# Patient Record
Sex: FEMALE | Race: WHITE | NOT HISPANIC OR LATINO | Employment: FULL TIME | URBAN - METROPOLITAN AREA
[De-identification: names, ages, dates, MRNs, and addresses within clinical notes are randomized per-mention and may not be internally consistent; named-entity substitution may affect disease eponyms.]

---

## 2018-02-28 ENCOUNTER — HOSPITAL ENCOUNTER (EMERGENCY)
Facility: HOSPITAL | Age: 58
Discharge: HOME | End: 2018-02-28
Attending: EMERGENCY MEDICINE
Payer: COMMERCIAL

## 2018-02-28 VITALS
DIASTOLIC BLOOD PRESSURE: 89 MMHG | OXYGEN SATURATION: 98 % | HEIGHT: 65 IN | HEART RATE: 68 BPM | WEIGHT: 160 LBS | TEMPERATURE: 99 F | RESPIRATION RATE: 18 BRPM | BODY MASS INDEX: 26.66 KG/M2 | SYSTOLIC BLOOD PRESSURE: 141 MMHG

## 2018-02-28 DIAGNOSIS — S00.83XA FOREHEAD CONTUSION, INITIAL ENCOUNTER: Primary | ICD-10-CM

## 2018-02-28 DIAGNOSIS — S09.90XA TRAUMATIC INJURY OF HEAD, INITIAL ENCOUNTER: ICD-10-CM

## 2018-02-28 PROCEDURE — 99284 EMERGENCY DEPT VISIT MOD MDM: CPT

## 2018-02-28 NOTE — ED PROVIDER NOTES
SCRIBE #1 NOTE: I, Genaro Frank, am scribing for, and in the presence of, Leona Landa MD. I have scribed the entire note.      History      Chief Complaint   Patient presents with    Head Injury     yesterday. Denies LOC. Sent from clinic for CT head. Takes plavix.        Review of patient's allergies indicates:   Allergen Reactions    Sulfa (sulfonamide antibiotics)         HPI   HPI    2/28/2018, 12:58 PM   History obtained from the patient      History of Present Illness: Tri Gomes is a 57 y.o. female patient who presents to the Emergency Department for a head injury which onset 1 day ago. Symptoms are constant and moderate in severity. Pt states she was moving a hog trap with her son and when it fell to the ground it pulled her with it and she hit the left side of her head on the trap. Pt is on a blood thinner (plavix). No mitigating or exacerbating factors reported. No associated sx reported. Patient denies any LOC, n/v/d, numbness, weakness, dizziness, confusion, visual changes, HA, lightheadedness, and all other sxs at this time. No further complaints or concerns at this time.         Arrival mode: Personal vehicle     PCP: Ivon Bermeo MD       Past Medical History:  Past Medical History:   Diagnosis Date    Hyperlipidemia     Hypertension        Past Surgical History:  Past Surgical History:   Procedure Laterality Date    EYE SURGERY           Family History:  Family History   Problem Relation Age of Onset    Heart disease Mother     Hypertension Mother     Heart disease Father     Hypertension Father     Cancer Maternal Grandfather     Diabetes Neg Hx        Social History:  Social History     Social History Main Topics    Smoking status: Current Every Day Smoker     Packs/day: 0.50     Years: 30.00    Smokeless tobacco: Never Used    Alcohol use Yes      Comment: social    Drug use: No    Sexual activity: Yes       ROS   Review of Systems   Constitutional:  Negative for chills and fever.        - LOC   HENT: Negative for sore throat.         + head injury   Eyes: Negative for photophobia and visual disturbance.   Respiratory: Negative for shortness of breath.    Cardiovascular: Negative for chest pain.   Gastrointestinal: Negative for diarrhea, nausea and vomiting.   Genitourinary: Negative for dysuria.   Musculoskeletal: Negative for back pain.   Skin: Negative for rash.   Neurological: Negative for dizziness, weakness, light-headedness, numbness and headaches.   Hematological: Does not bruise/bleed easily.   Psychiatric/Behavioral: Negative for confusion.       Physical Exam      Initial Vitals [02/28/18 1145]   BP Pulse Resp Temp SpO2   (!) 166/106 87 18 99.3 °F (37.4 °C) 98 %      MAP       126          Physical Exam  Nursing Notes and Vital Signs Reviewed.  Constitutional: Patient is in no acute distress. Well-developed and well-nourished.  Head: Normocephalic. Soft tissue contusion to the lateral left orbital region extending above the left eye brow with mild tenderness.   Eyes: PERRL. EOM intact. Conjunctivae are not pale. No scleral icterus. Visual fields are intact. No conjunctival injection or edema. Normal sclera. No proptosis.   ENT: Mucous membranes are moist. Oropharynx is clear and symmetric.    Neck: Supple. Full ROM. No lymphadenopathy.  Cardiovascular: Regular rate. Regular rhythm. No murmurs, rubs, or gallops. Distal pulses are 2+ and symmetric.  Pulmonary/Chest: No respiratory distress. Clear to auscultation bilaterally. No wheezing or rales.  Abdominal: Soft and non-distended.  There is no tenderness.  No rebound, guarding, or rigidity.   Musculoskeletal: Moves all extremities. No obvious deformities. No edema.  Skin: Warm and dry.  Neurological:  Alert, awake, and appropriate.  Normal speech.  No acute focal neurological deficits are appreciated.  Psychiatric: Normal affect. Good eye contact. Appropriate in content.    ED Course    Procedures  ED  "Vital Signs:  Vitals:    02/28/18 1145   BP: (!) 166/106   Pulse: 87   Resp: 18   Temp: 99.3 °F (37.4 °C)   TempSrc: Oral   SpO2: 98%   Weight: 72.6 kg (160 lb)   Height: 5' 5" (1.651 m)           Imaging Results:  Imaging Results          CT Head Without Contrast (Final result)  Result time 02/28/18 13:30:45    Final result by Genaro Day MD (02/28/18 13:30:45)                 Impression:       No acute abnormality identified.    All CT scans at this facility use dose modulation, iterative reconstruction and/or weight based dosing when appropriate to reduce radiation dose to as low as reasonably achievable.      Electronically signed by: GENARO DAY MD  Date:     02/28/18  Time:    13:30              Narrative:    Indication: Headache posttrauma.    Axial CT images of the head were obtained without  contrast.    Comparison:  None    Findings:    Ventricles, sulci, and cisterns are symmetric without evidence of mass effect. Possible arachnoid cyst or focal atrophy which may be developmental within the right parietal region. Brain volume and white matter are normal for age.  No intra or extraaxial masses, hemorrhages, abnormal fluid collections or abnormal calcifications. The cranium and extracranial structures are unremarkable.  Visualized sinuses and mastoid air cells are clear.                                      The Emergency Provider reviewed the vital signs and test results, which are outlined above.    ED Discussion     2:00 PM: Reassessed pt at this time.  Pt is awake, alert, and in no distress. Discussed with pt all pertinent ED information and results. Discussed pt dx and plan of tx. Gave pt all f/u and return to the ED instructions. All questions and concerns were addressed at this time. Pt expresses understanding of information and instructions, and is comfortable with plan to discharge. Pt is stable for discharge.    Patient's headache is either consistent with previous headache and/or lacks " features concerning for emergent or life threatening condition.  I do not suspect SAH, meningitis, increased IC pressure, infectious, toxic, vascular, CNS, or other EMC.  I have discussed this at length with patient and/or family/caretaker.      ED Medication(s):  Medications - No data to display    New Prescriptions    No medications on file       Follow-up Information     Ivon Bermeo MD. Schedule an appointment as soon as possible for a visit in 2 days.    Specialty:  Internal Medicine  Why:  Return to the Emergency Room, If symptoms worsen  Contact information:  [id number] [place] [id number]  [phone]                     Medical Decision Making    Medical Decision Making:   Clinical Tests:   Radiological Study: Ordered and Reviewed           Scribe Attestation:   Scribe #1: I performed the above scribed service and the documentation accurately describes the services I performed. I attest to the accuracy of the note.    Attending:   Physician Attestation Statement for Scribe #1: I, Leona Landa MD, personally performed the services described in this documentation, as scribed by Genaro Frank, in my presence, and it is both accurate and complete.          Clinical Impression       ICD-10-CM ICD-9-CM   1. Forehead contusion, initial encounter S00.83XA 920   2. Traumatic injury of head, initial encounter S09.90XA 959.01       Disposition:   Disposition: Discharged  Condition: Stable       Leona Landa MD  03/03/18 1149

## 2019-09-16 PROBLEM — F41.9 ANXIETY: Status: ACTIVE | Noted: 2019-09-16

## 2019-09-16 PROBLEM — Z00.00 ROUTINE GENERAL MEDICAL EXAMINATION AT A HEALTH CARE FACILITY: Status: ACTIVE | Noted: 2019-09-16

## 2019-09-16 PROBLEM — F17.200 SMOKER: Status: ACTIVE | Noted: 2019-09-16

## 2019-09-16 PROBLEM — R73.9 HYPERGLYCEMIA: Status: ACTIVE | Noted: 2019-09-16

## 2019-09-16 PROBLEM — M13.0 POLYARTHROPATHY: Status: ACTIVE | Noted: 2019-09-16

## 2019-09-16 PROBLEM — L98.9 SKIN LESION OF FACE: Status: ACTIVE | Noted: 2019-09-16

## 2019-09-16 PROBLEM — H54.8 BLINDNESS, LEGAL: Status: ACTIVE | Noted: 2019-09-16

## 2019-09-16 PROBLEM — E55.9 VITAMIN D DEFICIENCY: Status: ACTIVE | Noted: 2019-09-16

## 2019-09-16 PROBLEM — R91.1 PULMONARY NODULE: Status: ACTIVE | Noted: 2019-09-16

## 2019-09-16 PROBLEM — R53.82 CHRONIC FATIGUE: Status: ACTIVE | Noted: 2019-09-16

## 2019-09-16 PROBLEM — I25.10 CORONARY ARTERY DISEASE INVOLVING NATIVE CORONARY ARTERY OF NATIVE HEART WITHOUT ANGINA PECTORIS: Status: ACTIVE | Noted: 2019-09-16

## 2019-12-16 PROBLEM — Z00.00 ROUTINE GENERAL MEDICAL EXAMINATION AT A HEALTH CARE FACILITY: Status: RESOLVED | Noted: 2019-09-16 | Resolved: 2019-12-16

## 2020-01-12 PROBLEM — D35.01 ADENOMA OF RIGHT ADRENAL GLAND: Status: ACTIVE | Noted: 2020-01-12

## 2020-01-12 PROBLEM — R91.8 PULMONARY NODULES: Status: RESOLVED | Noted: 2019-09-16 | Resolved: 2020-01-12

## 2020-01-12 PROBLEM — D35.00 ADRENAL ADENOMA: Status: ACTIVE | Noted: 2020-01-12

## 2020-01-12 PROBLEM — D35.02 ADRENAL ADENOMA, LEFT: Status: ACTIVE | Noted: 2020-01-12

## 2020-01-12 PROBLEM — R91.8 PULMONARY NODULES: Status: ACTIVE | Noted: 2019-09-16

## 2020-01-12 PROBLEM — N28.1 RENAL CYST, LEFT: Status: ACTIVE | Noted: 2020-01-12

## 2020-01-12 PROBLEM — D18.03 LIVER HEMANGIOMA: Status: ACTIVE | Noted: 2020-01-12

## 2020-01-12 PROBLEM — R91.1 PULMONARY NODULE: Status: RESOLVED | Noted: 2019-09-16 | Resolved: 2020-01-12

## 2020-02-10 PROBLEM — D35.00 ADRENAL ADENOMA: Status: ACTIVE | Noted: 2020-01-12

## 2020-02-10 PROBLEM — R91.1 PULMONARY NODULE: Status: ACTIVE | Noted: 2020-02-10

## 2020-02-10 PROBLEM — E78.2 MIXED HYPERLIPIDEMIA: Status: ACTIVE | Noted: 2020-02-10

## 2020-02-10 PROBLEM — E04.1 THYROID NODULE: Status: ACTIVE | Noted: 2020-02-10

## 2020-03-21 PROBLEM — K14.8 TONGUE LESION: Status: ACTIVE | Noted: 2020-03-21

## 2020-03-21 PROBLEM — Z01.818 PREOP TESTING: Status: ACTIVE | Noted: 2019-09-16

## 2020-08-10 PROBLEM — C02.9 TONGUE CANCER: Status: ACTIVE | Noted: 2020-04-21

## 2021-04-28 ENCOUNTER — PATIENT MESSAGE (OUTPATIENT)
Dept: RESEARCH | Facility: HOSPITAL | Age: 61
End: 2021-04-28